# Patient Record
Sex: FEMALE | Race: WHITE | NOT HISPANIC OR LATINO | Employment: FULL TIME
[De-identification: names, ages, dates, MRNs, and addresses within clinical notes are randomized per-mention and may not be internally consistent; named-entity substitution may affect disease eponyms.]

---

## 2018-05-18 ENCOUNTER — RECORDS - HEALTHEAST (OUTPATIENT)
Dept: ADMINISTRATIVE | Facility: OTHER | Age: 33
End: 2018-05-18

## 2018-05-22 ENCOUNTER — RECORDS - HEALTHEAST (OUTPATIENT)
Dept: ADMINISTRATIVE | Facility: OTHER | Age: 33
End: 2018-05-22

## 2018-06-13 ENCOUNTER — HOSPITAL ENCOUNTER (OUTPATIENT)
Dept: OBGYN | Facility: CLINIC | Age: 33
Discharge: HOME | End: 2018-06-13

## 2018-06-15 ENCOUNTER — HOME CARE/HOSPICE - HEALTHEAST (OUTPATIENT)
Dept: HOME HEALTH SERVICES | Facility: HOME HEALTH | Age: 33
End: 2018-06-15

## 2018-06-17 ENCOUNTER — HOME CARE/HOSPICE - HEALTHEAST (OUTPATIENT)
Dept: HOME HEALTH SERVICES | Facility: HOME HEALTH | Age: 33
End: 2018-06-17

## 2019-09-21 ENCOUNTER — AMBULATORY - HEALTHEAST (OUTPATIENT)
Dept: NURSING | Facility: CLINIC | Age: 34
End: 2019-09-21

## 2021-06-26 ENCOUNTER — HEALTH MAINTENANCE LETTER (OUTPATIENT)
Age: 36
End: 2021-06-26

## 2021-10-16 ENCOUNTER — HEALTH MAINTENANCE LETTER (OUTPATIENT)
Age: 36
End: 2021-10-16

## 2021-11-01 ENCOUNTER — TRANSFERRED RECORDS (OUTPATIENT)
Dept: HEALTH INFORMATION MANAGEMENT | Facility: CLINIC | Age: 36
End: 2021-11-01
Payer: COMMERCIAL

## 2021-11-01 LAB
CHOLESTEROL (EXTERNAL): 188 MG/DL
HBA1C MFR BLD: 5 %
HDLC SERPL-MCNC: 63 MG/DL
LDL CHOLESTEROL (EXTERNAL): 104 MG/DL
NON HDL CHOLESTEROL (EXTERNAL): 125 MG/DL
TRIGLYCERIDES (EXTERNAL): 115 MG/DL
TSH SERPL-ACNC: 0.17 MIU/L (ref 0.4–4.5)

## 2021-11-05 ENCOUNTER — MEDICAL CORRESPONDENCE (OUTPATIENT)
Dept: HEALTH INFORMATION MANAGEMENT | Facility: CLINIC | Age: 36
End: 2021-11-05
Payer: COMMERCIAL

## 2021-11-09 ENCOUNTER — TELEPHONE (OUTPATIENT)
Dept: ENDOCRINOLOGY | Facility: CLINIC | Age: 36
End: 2021-11-09
Payer: COMMERCIAL

## 2021-11-09 ENCOUNTER — TRANSCRIBE ORDERS (OUTPATIENT)
Dept: OTHER | Age: 36
End: 2021-11-09
Payer: COMMERCIAL

## 2021-11-09 DIAGNOSIS — R79.89 LOW TSH LEVEL: Primary | ICD-10-CM

## 2021-11-09 NOTE — TELEPHONE ENCOUNTER
[facility] Call Center    Phone Message    May a detailed message be left on voicemail: yes     Reason for Call: Other: New pt unable to schedule within first two weeks, per protocol sending message to see if pt would be able to be scheduled earlier. pt requesting call back at #[phone]     Action Taken: Other: Endo    Travel Screening: Not Applicable

## 2021-11-11 NOTE — TELEPHONE ENCOUNTER
Ok for first available. Pt can call if she is having symptoms of hyperthyroidism so we can try to get her a sooner appt.

## 2021-11-11 NOTE — TELEPHONE ENCOUNTER
Date: 1/28/2022 Status: Scheduled   Time: 1:00 PM Length: 60   Visit Type: NEW ENDOCRINE [[id number]] Copay: $0.00   Provider: Kd Richard MD

## 2021-11-22 ENCOUNTER — TELEPHONE (OUTPATIENT)
Dept: ENDOCRINOLOGY | Facility: CLINIC | Age: 36
End: 2021-11-22
Payer: COMMERCIAL

## 2021-11-22 NOTE — TELEPHONE ENCOUNTER
[facility] Call [facility]    Phone Message    May a detailed message be left on voicemail: yes     Reason for Call: Patient was exposed to COVID-19 over the weekend , Patient is fully vaccinated, wants to know if in person appointment scheduled 11/24/2021 would need to be rescheduled? Please call patient back to advise.    Action Taken: Other: Endo    Travel Screening: Not Applicable

## 2021-11-24 ENCOUNTER — TELEPHONE (OUTPATIENT)
Dept: ENDOCRINOLOGY | Facility: CLINIC | Age: 36
End: 2021-11-24

## 2021-11-24 ENCOUNTER — VIRTUAL VISIT (OUTPATIENT)
Dept: ENDOCRINOLOGY | Facility: CLINIC | Age: 36
End: 2021-11-24
Attending: OBSTETRICS & GYNECOLOGY
Payer: COMMERCIAL

## 2021-11-24 DIAGNOSIS — R94.6 ABNORMAL FINDING ON THYROID FUNCTION TEST: Primary | ICD-10-CM

## 2021-11-24 PROBLEM — G43.909 MIGRAINE: Status: ACTIVE | Noted: 2021-11-24

## 2021-11-24 PROCEDURE — 99204 OFFICE O/P NEW MOD 45 MIN: CPT | Mod: 95 | Performed by: INTERNAL MEDICINE

## 2021-11-24 RX ORDER — NORETHINDRONE ACETATE AND ETHINYL ESTRADIOL 1MG-20(21)
1 KIT ORAL DAILY
COMMUNITY
End: 2022-04-29 | Stop reason: ALTCHOICE

## 2021-11-24 RX ORDER — PEDIATRIC MULTIVIT 61/D3/VIT K 1500-800
1 CAPSULE ORAL DAILY
COMMUNITY
End: 2023-11-03

## 2021-11-24 NOTE — PROGRESS NOTES
With that, ENDOCRINOLOGY NEW PATIENT VISIT        HISTORY OF PRESENT ILLNESS    Marylu Ibanez is seen in consultation at the request of Dr. Barry for abnormal thyroid function test.    The patient had annual gynecology visit on 2021: Labs drawn at that visit revealed low TSH, prompting referral to endocrinology.    The patient has been feeling generally well and has not noticed unusual symptoms: She has had some fatigue, but is also working and parenting young children and had attributed it to her life commitments.  No weight loss (perhaps 5-7 lb weight gain during pandemic)  No heat intolerance (chronic cold intolerance).  No tremor.  No palpitations.  No change in bowel movements.  No changes in skin/hair texture.     No goiter.  No dysphagia.  No hoarseness.  No stridor.    No recent sore throat or other URI symptoms.  No fevers.    No eye pain (dry eyes with contact lens use).  No conjunctival injection.  No visual changes.    No biotin supplement use. No recent steroid therapy.    No family history of thyroid disease.       On OCP for contraception at present, cycles are light and every few months.  No plans for conception in the future.    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 .    PAST MEDICAL HISTORY  History reviewed. No pertinent past medical history.  Patient Active Problem List   Diagnosis     Migraine       MEDICATIONS  Current Outpatient Medications   Medication Sig Dispense Refill     mvw complete formulation (CHEWABLES) tablet Take 1 tablet by mouth daily       norethindrone-ethinyl estradiol (JUNEL FE 1/20) 1-20 MG-MCG tablet Take 1 tablet by mouth daily         Allergies, family, and social history were reviewed and documented as needed in EHR.     REVIEW OF SYSTEMS  A focused ROS was performed, with pertinent positives and negatives as noted in the HPI.    PHYSICAL EXAM  There were no vitals taken for this visit.  There is no height or  weight on file to calculate BMI.  Constitutional: Patient is alert, oriented and appears in no acute distress.  Eyes: Eyes grossly normal to inspection, EOMI, no stare, lid lag, or retraction; no conjunctival injection.  ENMT: Lips are without lesions.   Neck: No visible goiter or neck mass.  Respiratory: No audible wheeze or cough. No visible cyanosis. No visible increased work of breathing.  Neurological: Alert and oriented times 3.  Cranial nerves grossly intact.      DATA REVIEW  Each of the following laboratory and/or imaging studies were reviewed.    Labs from [facility] reviewed.  Pertinent findings are below:  11/1/2021: TSH 0.17.  Cholesterol 188, HDL 63, triglycerides 115, .  Hemoglobin A1c 5%.      ASSESSMENT  1.  Abnormal thyroid function test.  Low TSH.  Does not have overt thyrotoxic symptoms, not on medications that could potentially cause thyroid function test abnormalities.  Would evaluate TSH along with T4 and T3 and serology for Graves' disease.  Based on results, we can determine next best steps. If labs are consistent with Graves' disease, may follow carefully if she has subclinical hyperthyroidism or intervene sooner to discuss treatment options if she has overt hyperthyroidism.  Conversely, if serology is negative, and abnormal thyroid function tests persist, would consider radioiodine uptake and scan to better differentiate cause.  We discussed normal physiology of hypothalamic-pituitary-thyroidal axis, pathophysiology of hyperthyroidism and potential causes of hyperthyroidism.  We also briefly reviewed treatment options, although we will review these in more detail if treatment is indicated.    PLAN  -Labs when safely able to go to lab for blood draw  -Based on results, we will decide on next best steps for testing and/or treatment  -Return for a follow-up visit in 2 months (we will coordinate closer follow-up if we need to discuss results in more detail  sooner)  -We will communicate results via [employer], or if needed by phone    Orders Placed This Encounter   Procedures     HCG Quantitative Pregnancy, Blood ([id number])     TSH     T4 free     T3 total     Thyroid stimulating immunoglobulin     Thyrotropin Receptor Antibody         I spent a total of 45 minutes on the date of encounter reviewing medical records, evaluating the patient, coordinating care and documenting in the EHR, as detailed above.      Video-Visit Details    Type of service:  Video Visit    Video Start Time: 12:52 PM  Video End Time: 1:18 PM    Platform used for Video Visit: Carla Richard MD   Division of Diabetes, Endocrinology and Metabolism  Department of Medicine    cc: Prema Barry DO; Hilary Coyne MD         Marylu is a 36 year old who is being evaluated via a billable video visit.      How would you like to obtain your AVS? [facility]  If the video visit is dropped, the invitation should be resent by: Text to cell phone: [phone]  Will anyone else be joining your video visit? no

## 2021-11-24 NOTE — PATIENT INSTRUCTIONS
-Labs when safely able to go to lab for blood draw  -Based on results, we will decide on next best steps for testing and/or treatment  -Return for a follow-up visit in 2 months (we will coordinate closer follow-up if we need to discuss results in more detail sooner)  -We will communicate results via [employer], or if needed by phone

## 2021-11-24 NOTE — LETTER
2021         RE: Marylu Ibanez  [address] [facility] [address]        Dear Colleague,    Thank you for referring your patient, Marylu Ibanez, to the [facility]. Please see a copy of my visit note below.    With that, ENDOCRINOLOGY NEW PATIENT VISIT        HISTORY OF PRESENT ILLNESS    Marylu Ibanez is seen in consultation at the request of Dr. Barry for abnormal thyroid function test.    The patient had annual gynecology visit on 2021: Labs drawn at that visit revealed low TSH, prompting referral to endocrinology.    The patient has been feeling generally well and has not noticed unusual symptoms: She has had some fatigue, but is also working and parenting young children and had attributed it to her life commitments.  No weight loss (perhaps 5-7 lb weight gain during pandemic)  No heat intolerance (chronic cold intolerance).  No tremor.  No palpitations.  No change in bowel movements.  No changes in skin/hair texture.     No goiter.  No dysphagia.  No hoarseness.  No stridor.    No recent sore throat or other URI symptoms.  No fevers.    No eye pain (dry eyes with contact lens use).  No conjunctival injection.  No visual changes.    No biotin supplement use. No recent steroid therapy.    No family history of thyroid disease.       On OCP for contraception at present, cycles are light and every few months.  No plans for conception in the future.    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 .    PAST MEDICAL HISTORY  History reviewed. No pertinent past medical history.  Patient Active Problem List   Diagnosis     Migraine       MEDICATIONS  Current Outpatient Medications   Medication Sig Dispense Refill     mvw complete formulation (CHEWABLES) tablet Take 1 tablet by mouth daily       norethindrone-ethinyl estradiol (JUNEL FE 1/20) 1-20 MG-MCG tablet Take 1 tablet by mouth daily         Allergies, family, and social  history were reviewed and documented as needed in EHR.     REVIEW OF SYSTEMS  A focused ROS was performed, with pertinent positives and negatives as noted in the HPI.    PHYSICAL EXAM  There were no vitals taken for this visit.  There is no height or weight on file to calculate BMI.  Constitutional: Patient is alert, oriented and appears in no acute distress.  Eyes: Eyes grossly normal to inspection, EOMI, no stare, lid lag, or retraction; no conjunctival injection.  ENMT: Lips are without lesions.   Neck: No visible goiter or neck mass.  Respiratory: No audible wheeze or cough. No visible cyanosis. No visible increased work of breathing.  Neurological: Alert and oriented times 3.  Cranial nerves grossly intact.      DATA REVIEW  Each of the following laboratory and/or imaging studies were reviewed.    Labs from [facility] reviewed.  Pertinent findings are below:  11/1/2021: TSH 0.17.  Cholesterol 188, HDL 63, triglycerides 115, .  Hemoglobin A1c 5%.      ASSESSMENT  1.  Abnormal thyroid function test.  Low TSH.  Does not have overt thyrotoxic symptoms, not on medications that could potentially cause thyroid function test abnormalities.  Would evaluate TSH along with T4 and T3 and serology for Graves' disease.  Based on results, we can determine next best steps. If labs are consistent with Graves' disease, may follow carefully if she has subclinical hyperthyroidism or intervene sooner to discuss treatment options if she has overt hyperthyroidism.  Conversely, if serology is negative, and abnormal thyroid function tests persist, would consider radioiodine uptake and scan to better differentiate cause.  We discussed normal physiology of hypothalamic-pituitary-thyroidal axis, pathophysiology of hyperthyroidism and potential causes of hyperthyroidism.  We also briefly reviewed treatment options, although we will review these in more detail if treatment is indicated.    PLAN  -Labs when safely  able to go to lab for blood draw  -Based on results, we will decide on next best steps for testing and/or treatment  -Return for a follow-up visit in 2 months (we will coordinate closer follow-up if we need to discuss results in more detail sooner)  -We will communicate results via [employer], or if needed by phone    Orders Placed This Encounter   Procedures     HCG Quantitative Pregnancy, Blood ([id number])     TSH     T4 free     T3 total     Thyroid stimulating immunoglobulin     Thyrotropin Receptor Antibody         I spent a total of 45 minutes on the date of encounter reviewing medical records, evaluating the patient, coordinating care and documenting in the EHR, as detailed above.      Video-Visit Details    Type of service:  Video Visit    Video Start Time: 12:52 PM  Video End Time: 1:18 PM    Platform used for Video Visit: Carla Richard MD   Division of Diabetes, Endocrinology and Metabolism  Department of Medicine    cc: Prema Barry DO; Hilary Coyne MD         Marylu is a 36 year old who is being evaluated via a billable video visit.      How would you like to obtain your AVS? [employer]  If the video visit is dropped, the invitation should be resent by: Text to cell phone: [phone]  Will anyone else be joining your video visit? no              Again, thank you for allowing me to participate in the care of your patient.        Sincerely,        IRVING Richard MD

## 2021-11-24 NOTE — TELEPHONE ENCOUNTER
[facility] Call Center    Phone Message    May a detailed message be left on voicemail: yes     Reason for Call: Other: Pt called at 12:45pm because the provider had still not joined the video appt. Writer informed pt unsure if provider is running behind. Pt appears to be checked in for appt in her chart. Please call pt if there are to be any further delays with provider joining the appt today.     Action Taken: Message routed to:  Other: endocrine    Travel Screening: Not Applicable

## 2021-12-14 ENCOUNTER — LAB (OUTPATIENT)
Dept: LAB | Facility: CLINIC | Age: 36
End: 2021-12-14
Payer: COMMERCIAL

## 2021-12-14 DIAGNOSIS — R94.6 ABNORMAL FINDING ON THYROID FUNCTION TEST: ICD-10-CM

## 2021-12-14 LAB
HCG SERPL-ACNC: <2 MLU/ML (ref 0–4)
T3 SERPL-MCNC: 122 NG/DL (ref 60–181)
T4 FREE SERPL-MCNC: 0.98 NG/DL (ref 0.7–1.8)
TSH SERPL DL<=0.005 MIU/L-ACNC: 0.28 UIU/ML (ref 0.3–5)

## 2021-12-14 PROCEDURE — 84445 ASSAY OF TSI GLOBULIN: CPT | Mod: 90

## 2021-12-14 PROCEDURE — 84443 ASSAY THYROID STIM HORMONE: CPT

## 2021-12-14 PROCEDURE — 84439 ASSAY OF FREE THYROXINE: CPT

## 2021-12-14 PROCEDURE — 36415 COLL VENOUS BLD VENIPUNCTURE: CPT

## 2021-12-14 PROCEDURE — 84702 CHORIONIC GONADOTROPIN TEST: CPT

## 2021-12-14 PROCEDURE — 84480 ASSAY TRIIODOTHYRONINE (T3): CPT

## 2021-12-14 PROCEDURE — 83520 IMMUNOASSAY QUANT NOS NONAB: CPT | Mod: 90

## 2021-12-14 PROCEDURE — 99000 SPECIMEN HANDLING OFFICE-LAB: CPT

## 2021-12-16 LAB — TSH RECEP AB SER-ACNC: <1.1 IU/L (ref 0–1.75)

## 2021-12-21 LAB — TSI SER-ACNC: <1 TSI INDEX

## 2022-01-25 ENCOUNTER — LAB (OUTPATIENT)
Dept: LAB | Facility: CLINIC | Age: 37
End: 2022-01-25
Payer: COMMERCIAL

## 2022-01-25 DIAGNOSIS — R94.6 ABNORMAL FINDING ON THYROID FUNCTION TEST: ICD-10-CM

## 2022-01-25 LAB
T3 SERPL-MCNC: 131 NG/DL (ref 60–181)
T4 FREE SERPL-MCNC: 1.07 NG/DL (ref 0.7–1.8)
TSH SERPL DL<=0.005 MIU/L-ACNC: 0.26 UIU/ML (ref 0.3–5)

## 2022-01-25 PROCEDURE — 84439 ASSAY OF FREE THYROXINE: CPT

## 2022-01-25 PROCEDURE — 84480 ASSAY TRIIODOTHYRONINE (T3): CPT

## 2022-01-25 PROCEDURE — 36415 COLL VENOUS BLD VENIPUNCTURE: CPT

## 2022-01-25 PROCEDURE — 84443 ASSAY THYROID STIM HORMONE: CPT

## 2022-01-28 ENCOUNTER — OFFICE VISIT (OUTPATIENT)
Dept: ENDOCRINOLOGY | Facility: CLINIC | Age: 37
End: 2022-01-28
Payer: COMMERCIAL

## 2022-01-28 VITALS
BODY MASS INDEX: 22.45 KG/M2 | DIASTOLIC BLOOD PRESSURE: 72 MMHG | SYSTOLIC BLOOD PRESSURE: 120 MMHG | WEIGHT: 134.9 LBS | HEART RATE: 80 BPM

## 2022-01-28 DIAGNOSIS — R94.6 ABNORMAL FINDING ON THYROID FUNCTION TEST: Primary | ICD-10-CM

## 2022-01-28 PROCEDURE — 99214 OFFICE O/P EST MOD 30 MIN: CPT | Performed by: INTERNAL MEDICINE

## 2022-01-28 NOTE — PROGRESS NOTES
ENDOCRINOLOGY FOLLOW-UP         HISTORY OF PRESENT ILLNESS    Marylu Ibanez is seen in follow-up for the issues outlined below.     After initial visit on 2021, I referred patient for additional studies.  Thyroid function tests remained abnormal (although TSH was slightly improved) while TSH receptor antibody and TSI were negative.  We had contemplated radioiodine uptake and scan with the patient was diagnosed with COVID-19 infection in early 2022 and did not have a chance to give more thought to this testing.    Her COVID-19 symptoms were mild: She had headache and body aches as well as sore throat which resolved within a few days.  Outside of those symptoms, she has been feeling well: Energy is good.  No tremors, no palpitations, no change in temperature tolerance.  Menstrual cycles remain regular, albeit light, on OCP.    No eye discomfort, vision changes or conjunctival injection.    Pertinent endocrine and related history:  1.  Abnormal thyroid function tests. The patient had annual gynecology visit on 2021: Labs drawn at that visit revealed low TSH, prompting referral to endocrinology.  -At initial endocrinology evaluation, the patient had been feeling well and had not noticed overt thyrotoxic symptoms.  -. On OCP for contraception. No plans for conception in the future.    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 .    MEDICATIONS  Current Outpatient Medications   Medication Sig Dispense Refill     mvw complete formulation (CHEWABLES) tablet Take 1 tablet by mouth daily       norethindrone-ethinyl estradiol (JUNEL FE 1/20) 1-20 MG-MCG tablet Take 1 tablet by mouth daily         Allergies, family, and social history were reviewed and documented as needed in EHR.     REVIEW OF SYSTEMS  A focused ROS was performed, with pertinent positives and negatives as noted in the HPI.    PHYSICAL EXAM  /72 (BP Location: Right arm, Patient Position:  Sitting, Cuff Size: Adult Regular)   Pulse 80   Wt 61.2 kg (134 lb 14.4 oz)   BMI 22.45 kg/m    Body mass index is 22.45 kg/m .  Constitutional: Vital signs reviewed, as recorded above. Patient is alert, oriented and appears in no acute distress.  Eyes: PER, EOMI, no stare, lid lag, or retraction; no conjunctival injection.  Neck: Neck supple, no palpable thyromegaly.  Cardiovascular: RRR, normal S1/S2, no audible murmurs, rubs or gallops.  Respiratory: CTAB, without wheezes, crackles or rhonchi; normal chest wall motion and respiratory effort.  MSK: No clubbing or cyanosis; normal muscle bulk and tone.  Skin: Normal skin color, temperature, turgor and texture.  Neurological: Alert and oriented times 3.  Very fine tremor of outstretched hands.    DATA REVIEW  Each of the following laboratory and/or imaging studies were reviewed.       12/14/2021 13:25 1/25/2022 13:55   hCG Quantitative <2    T4 Free 0.98 1.07   Thyroid Stim Immunog <1.0    Triiodothyronine (T3) 122 131   TSH 0.28 (L) 0.26 (L)   Thyrotropin Receptor Antibody <1.10      ASSESSMENT  1.  Abnormal thyroid function test.  Low TSH, which persists on follow-up testing.  However, T4 and T3 levels are normal and serology for Graves' disease is negative.  Seronegative Graves' remains on the differential, as does hyperfunctioning nodule(s).  Therefore, although she does not have overt thyrotoxic symptoms, would recommend proceeding with radioiodine uptake and scan to better differentiate cause of subclinical hyperthyroidism and make plans for long-term management.  We discussed differential diagnosis, proposed testing as well as options for management based on the cause of hyperthyroidism.  We will add on pregnancy test in anticipation of radioiodine uptake and scan.  If she is not able to schedule study within the next few weeks, she will update me so that we can recheck thyroid function test and pregnancy test closer to the time of imaging study.  We will  plan on clinic follow-up in 3 months, sooner if needed.    PLAN  -For now, careful follow-up  -Schedule radioiodine uptake and scan (if scheduling beyond 3 weeks out, let me know and I can order labs to be drawn closer to the time of imaging study)  -Return for a follow-up visit in 3 months  -We will communicate results via [email or web address], or if needed by phone    Orders Placed This Encounter   Procedures     NM Thyroid Uptake and Scan     HCG Quantitative Pregnancy, Blood ([id number])     I spent a total of 34 minutes on the date of encounter reviewing medical records, evaluating the patient, coordinating care and documenting in the EHR, as detailed above.    Kd Richard MD   Division of Diabetes, Endocrinology and Metabolism  Department of Medicine

## 2022-01-28 NOTE — PATIENT INSTRUCTIONS
-For now,careful follow-up  -Schedule radioiodine uptake and scan (if scheduling beyond 3 weeks out, let me know and I can order thyroid function tests to be drawn closer to the time of imaging study)  -Return for a follow-up visit in 3 months  -We will communicate results via [employer], or if needed by phone

## 2022-01-28 NOTE — LETTER
2022         RE: Marylu Ibanez  [address] [facility] [address]        Dear Colleague,    Thank you for referring your patient, Marylu Ibanez, to the [facility]. Please see a copy of my visit note below.      ENDOCRINOLOGY FOLLOW-UP         HISTORY OF PRESENT ILLNESS    Marylu Ibanez is seen in follow-up for the issues outlined below.     After initial visit on 2021, I referred patient for additional studies.  Thyroid function tests remained abnormal (although TSH was slightly improved) while TSH receptor antibody and TSI were negative.  We had contemplated radioiodine uptake and scan with the patient was diagnosed with COVID-19 infection in early 2022 and did not have a chance to give more thought to this testing.    Her COVID-19 symptoms were mild: She had headache and body aches as well as sore throat which resolved within a few days.  Outside of those symptoms, she has been feeling well: Energy is good.  No tremors, no palpitations, no change in temperature tolerance.  Menstrual cycles remain regular, albeit light, on OCP.    No eye discomfort, vision changes or conjunctival injection.    Pertinent endocrine and related history:  1.  Abnormal thyroid function tests. The patient had annual gynecology visit on 2021: Labs drawn at that visit revealed low TSH, prompting referral to endocrinology.  -At initial endocrinology evaluation, the patient had been feeling well and had not noticed overt thyrotoxic symptoms.  -. On OCP for contraception. No plans for conception in the future.    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 .    MEDICATIONS  Current Outpatient Medications   Medication Sig Dispense Refill     mvw complete formulation (CHEWABLES) tablet Take 1 tablet by mouth daily       norethindrone-ethinyl estradiol (JUNEL FE 1/20) 1-20 MG-MCG tablet Take 1 tablet by mouth daily         Allergies, family,  and social history were reviewed and documented as needed in EHR.     REVIEW OF SYSTEMS  A focused ROS was performed, with pertinent positives and negatives as noted in the HPI.    PHYSICAL EXAM  /72 (BP Location: Right arm, Patient Position: Sitting, Cuff Size: Adult Regular)   Pulse 80   Wt 61.2 kg (134 lb 14.4 oz)   BMI 22.45 kg/m    Body mass index is 22.45 kg/m .  Constitutional: Vital signs reviewed, as recorded above. Patient is alert, oriented and appears in no acute distress.  Eyes: PER, EOMI, no stare, lid lag, or retraction; no conjunctival injection.  Neck: Neck supple, no palpable thyromegaly.  Cardiovascular: RRR, normal S1/S2, no audible murmurs, rubs or gallops.  Respiratory: CTAB, without wheezes, crackles or rhonchi; normal chest wall motion and respiratory effort.  MSK: No clubbing or cyanosis; normal muscle bulk and tone.  Skin: Normal skin color, temperature, turgor and texture.  Neurological: Alert and oriented times 3.  Very fine tremor of outstretched hands.    DATA REVIEW  Each of the following laboratory and/or imaging studies were reviewed.       12/14/2021 13:25 1/25/2022 13:55   hCG Quantitative <2    T4 Free 0.98 1.07   Thyroid Stim Immunog <1.0    Triiodothyronine (T3) 122 131   TSH 0.28 (L) 0.26 (L)   Thyrotropin Receptor Antibody <1.10      ASSESSMENT  1.  Abnormal thyroid function test.  Low TSH, which persists on follow-up testing.  However, T4 and T3 levels are normal and serology for Graves' disease is negative.  Seronegative Graves' remains on the differential, as does hyperfunctioning nodule(s).  Therefore, although she does not have overt thyrotoxic symptoms, would recommend proceeding with radioiodine uptake and scan to better differentiate cause of subclinical hyperthyroidism and make plans for long-term management.  We discussed differential diagnosis, proposed testing as well as options for management based on the cause of hyperthyroidism.  We will add on  pregnancy test in anticipation of radioiodine uptake and scan.  If she is not able to schedule study within the next few weeks, she will update me so that we can recheck thyroid function test and pregnancy test closer to the time of imaging study.  We will plan on clinic follow-up in 3 months, sooner if needed.    PLAN  -For now, careful follow-up  -Schedule radioiodine uptake and scan (if scheduling beyond 3 weeks out, let me know and I can order labs to be drawn closer to the time of imaging study)  -Return for a follow-up visit in 3 months  -We will communicate results via [employer], or if needed by phone    Orders Placed This Encounter   Procedures     NM Thyroid Uptake and Scan     HCG Quantitative Pregnancy, Blood ([id number])     I spent a total of 34 minutes on the date of encounter reviewing medical records, evaluating the patient, coordinating care and documenting in the EHR, as detailed above.    Irving Richard MD   Division of Diabetes, Endocrinology and Metabolism  Department of Medicine              Again, thank you for allowing me to participate in the care of your patient.        Sincerely,        IRVING Richard MD

## 2022-02-07 ENCOUNTER — HOSPITAL ENCOUNTER (OUTPATIENT)
Dept: NUCLEAR MEDICINE | Facility: HOSPITAL | Age: 37
Discharge: HOME | End: 2022-02-07
Attending: INTERNAL MEDICINE | Admitting: INTERNAL MEDICINE
Payer: COMMERCIAL

## 2022-02-07 DIAGNOSIS — R94.6 ABNORMAL FINDING ON THYROID FUNCTION TEST: ICD-10-CM

## 2022-02-07 PROCEDURE — 343N000001 HC RX 343: Performed by: INTERNAL MEDICINE

## 2022-02-07 PROCEDURE — A9516 IODINE I-123 SOD IODIDE MIC: HCPCS | Performed by: INTERNAL MEDICINE

## 2022-02-07 PROCEDURE — 78014 THYROID IMAGING W/BLOOD FLOW: CPT

## 2022-02-07 RX ADMIN — Medication 255 UCI.: at 07:33

## 2022-02-08 ENCOUNTER — HOSPITAL ENCOUNTER (OUTPATIENT)
Dept: NUCLEAR MEDICINE | Facility: HOSPITAL | Age: 37
End: 2022-02-08
Attending: INTERNAL MEDICINE
Payer: COMMERCIAL

## 2022-04-26 ENCOUNTER — LAB (OUTPATIENT)
Dept: LAB | Facility: CLINIC | Age: 37
End: 2022-04-26
Payer: COMMERCIAL

## 2022-04-26 DIAGNOSIS — R94.6 ABNORMAL FINDING ON THYROID FUNCTION TEST: ICD-10-CM

## 2022-04-26 LAB
T3 SERPL-MCNC: 121 NG/DL (ref 60–181)
T4 FREE SERPL-MCNC: 0.98 NG/DL (ref 0.7–1.8)
TSH SERPL DL<=0.005 MIU/L-ACNC: 0.23 UIU/ML (ref 0.3–5)

## 2022-04-26 PROCEDURE — 36415 COLL VENOUS BLD VENIPUNCTURE: CPT

## 2022-04-26 PROCEDURE — 84480 ASSAY TRIIODOTHYRONINE (T3): CPT

## 2022-04-26 PROCEDURE — 84439 ASSAY OF FREE THYROXINE: CPT

## 2022-04-26 PROCEDURE — 84443 ASSAY THYROID STIM HORMONE: CPT

## 2022-04-29 ENCOUNTER — OFFICE VISIT (OUTPATIENT)
Dept: ENDOCRINOLOGY | Facility: CLINIC | Age: 37
End: 2022-04-29
Payer: COMMERCIAL

## 2022-04-29 VITALS
DIASTOLIC BLOOD PRESSURE: 64 MMHG | BODY MASS INDEX: 23.3 KG/M2 | WEIGHT: 140 LBS | SYSTOLIC BLOOD PRESSURE: 110 MMHG | HEART RATE: 80 BPM

## 2022-04-29 DIAGNOSIS — E05.90 SUBCLINICAL HYPERTHYROIDISM: ICD-10-CM

## 2022-04-29 DIAGNOSIS — E05.00 GRAVES DISEASE: Primary | ICD-10-CM

## 2022-04-29 PROCEDURE — 99214 OFFICE O/P EST MOD 30 MIN: CPT | Performed by: INTERNAL MEDICINE

## 2022-04-29 RX ORDER — NORETHINDRONE ACETATE AND ETHINYL ESTRADIOL 1; 20 MG/1; UG/1
1 TABLET ORAL DAILY
COMMUNITY
Start: 2022-04-23

## 2022-04-29 NOTE — LETTER
2022         RE: Marylu Ibanez  [address] [facility] [id number]        Dear Colleague,    Thank you for referring your patient, Marylu Ibanez, to the [facility]. Please see a copy of my visit note below.      ENDOCRINOLOGY FOLLOW-UP         HISTORY OF PRESENT ILLNESS    Marylu Ibanez is seen in follow-up for the issues outlined below.     After last visit in 2022, I referred patient for radioiodine uptake and scan given persistently abnormal thyroid function tests and negative Graves' serology.    She had radioiodine uptake and scan performed on 2022.  The study showed 9.7% uptake at 3 hours, 20.7% uptake at 24 hours.  Tracer uptake was diffuse throughout the gland without hot or cold nodules.    She feels well: Energy is fairly good.  No tremors or palpitations.  No change in bowel habits.  No change in temperature tolerance.  Menstrual cycles remain regular on OCP, although light.    No eye discomfort, redness, prominence or change in vision such as diplopia.    Pertinent endocrine and related history:  1.  Abnormal thyroid function tests. The patient had annual gynecology visit on 2021: Labs drawn at that visit revealed low TSH, prompting referral to endocrinology.  -At initial endocrinology evaluation, the patient had been feeling well and had not noticed overt thyrotoxic symptoms.  -TSI and TSH receptor antibody checked in 2021 were negative.  -. On OCP for contraception. No plans for conception in the future.    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 .    PAST MEDICAL HISTORY  History reviewed. No pertinent past medical history.    MEDICATIONS  Current Outpatient Medications   Medication Sig Dispense Refill     JUNEL 1/20 1-20 MG-MCG tablet 1 tablet daily       mvw complete formulation (CHEWABLES) tablet Take 1 tablet by mouth daily         Allergies, family, and social history were reviewed and documented  as needed in EHR.     REVIEW OF SYSTEMS  A focused ROS was performed, with pertinent positives and negatives as noted in the HPI.    PHYSICAL EXAM  /64 (BP Location: Right arm, Patient Position: Sitting, Cuff Size: Adult Regular)   Pulse 80   Wt 63.5 kg (140 lb)   BMI 23.30 kg/m    Body mass index is 23.3 kg/m .  Constitutional: Vital signs reviewed, as recorded above. Patient is alert, oriented and appears in no acute distress.  Eyes: PER, EOMI, no stare, lid lag, or retraction; no conjunctival injection.  Neck: Neck supple, no palpable thyromegaly.  Cardiovascular: RRR, normal S1/S2, no audible murmurs, rubs or gallops.  Respiratory: CTAB, without wheezes, crackles or rhonchi; normal chest wall motion and respiratory effort.  MSK: No clubbing or cyanosis; normal muscle bulk and tone.  Skin: Normal skin color, temperature, turgor and texture.  Neurological: Alert and oriented times 3.  No tremor, reflexes 2+ and symmetric with normal relaxation phase.    DATA REVIEW  Each of the following laboratory and/or imaging studies were reviewed.    Lab on 2022   Component Date Value Ref Range Status     T3 Total 2022 121  60 - 181 ng/dL Final     Free T4 2022 0.98  0.70 - 1.80 ng/dL Final    Performance of the Free T4 test has not been established with  specimens (<= 2 months of age).       TSH 2022 0.23 (A) 0.30 - 5.00 uIU/mL Final         ASSESSMENT  1.  Subclinical hyperthyroidism, due to Graves' disease.  Persistently low TSH, with normal T4 and T3 consistent with subclinical hyperthyroidism which persists on follow-up testing.  Although seronegative, radioiodine uptake and scan performed in 2022 (personally reviewed images with patient) shows a diffuse uptake pattern consistent with Graves' disease.  The patient remains asymptomatic.  Given her age, subtlety of subclinical hyperthyroidism and potential for Graves' to go into remission, would continue to follow carefully  without treatment.  However, if subclinical hyperthyroidism progresses, patients develop symptoms, or this degree of subclinical hyperthyroidism persists over the coming few years would consider therapy.  We discussed pathophysiology of Graves' disease, thresholds for considering treatment in subclinical hyperthyroidism, as well as treatment options (antithyroid drugs versus radioiodine therapy and surgery, their benefits and side effects).  The patient is in agreement with our plan.  We also discussed symptoms to monitor for that might suggest progressing hyperthyroidism that should prompt more immediate contact with our clinic.  If stable, anticipate follow-up in 6 months, with labs before visit.    2.  Graves' disease.  Without evidence of ophthalmopathy or dermopathy.  We discussed symptoms that might suggest ophthalmopathy that should prompt contact with me.    PLAN  -Continue careful follow-up  -Monitor symptoms and pulse; contact me if noting unusual symptoms or pulse trending higher (90's or higher after resting for 5 minutes)  -Return for a follow-up visit in 6 months, with labs before visit  -We will communicate results via [employer], or if needed by phone    Orders Placed This Encounter   Procedures     TSH     T4 free     T3 total       I spent a total of 33 minutes on the date of encounter reviewing medical records, evaluating the patient, coordinating care and documenting in the EHR, as detailed above.      Irving Richard MD   Division of Diabetes, Endocrinology and Metabolism  Department of Medicine              Again, thank you for allowing me to participate in the care of your patient.        Sincerely,        IRVING Richard MD

## 2022-04-29 NOTE — PROGRESS NOTES
ENDOCRINOLOGY FOLLOW-UP         HISTORY OF PRESENT ILLNESS    Marylu Ibanez is seen in follow-up for the issues outlined below.     After last visit in 2022, I referred patient for radioiodine uptake and scan given persistently abnormal thyroid function tests and negative Graves' serology.    She had radioiodine uptake and scan performed on 2022.  The study showed 9.7% uptake at 3 hours, 20.7% uptake at 24 hours.  Tracer uptake was diffuse throughout the gland without hot or cold nodules.    She feels well: Energy is fairly good.  No tremors or palpitations.  No change in bowel habits.  No change in temperature tolerance.  Menstrual cycles remain regular on OCP, although light.    No eye discomfort, redness, prominence or change in vision such as diplopia.    Pertinent endocrine and related history:  1.  Abnormal thyroid function tests. The patient had annual gynecology visit on 2021: Labs drawn at that visit revealed low TSH, prompting referral to endocrinology.  -At initial endocrinology evaluation, the patient had been feeling well and had not noticed overt thyrotoxic symptoms.  -TSI and TSH receptor antibody checked in 2021 were negative.  -. On OCP for contraception. No plans for conception in the future.    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 .    PAST MEDICAL HISTORY  History reviewed. No pertinent past medical history.    MEDICATIONS  Current Outpatient Medications   Medication Sig Dispense Refill     JUNEL 1/20 1-20 MG-MCG tablet 1 tablet daily       mvw complete formulation (CHEWABLES) tablet Take 1 tablet by mouth daily         Allergies, family, and social history were reviewed and documented as needed in EHR.     REVIEW OF SYSTEMS  A focused ROS was performed, with pertinent positives and negatives as noted in the HPI.    PHYSICAL EXAM  /64 (BP Location: Right arm, Patient Position: Sitting, Cuff Size: Adult Regular)   Pulse 80    Wt 63.5 kg (140 lb)   BMI 23.30 kg/m    Body mass index is 23.3 kg/m .  Constitutional: Vital signs reviewed, as recorded above. Patient is alert, oriented and appears in no acute distress.  Eyes: PER, EOMI, no stare, lid lag, or retraction; no conjunctival injection.  Neck: Neck supple, no palpable thyromegaly.  Cardiovascular: RRR, normal S1/S2, no audible murmurs, rubs or gallops.  Respiratory: CTAB, without wheezes, crackles or rhonchi; normal chest wall motion and respiratory effort.  MSK: No clubbing or cyanosis; normal muscle bulk and tone.  Skin: Normal skin color, temperature, turgor and texture.  Neurological: Alert and oriented times 3.  No tremor, reflexes 2+ and symmetric with normal relaxation phase.    DATA REVIEW  Each of the following laboratory and/or imaging studies were reviewed.    Lab on 2022   Component Date Value Ref Range Status     T3 Total 2022 121  60 - 181 ng/dL Final     Free T4 2022 0.98  0.70 - 1.80 ng/dL Final    Performance of the Free T4 test has not been established with  specimens (<= 2 months of age).       TSH 2022 0.23 (A) 0.30 - 5.00 uIU/mL Final         ASSESSMENT  1.  Subclinical hyperthyroidism, due to Graves' disease.  Persistently low TSH, with normal T4 and T3 consistent with subclinical hyperthyroidism which persists on follow-up testing.  Although seronegative, radioiodine uptake and scan performed in 2022 (personally reviewed images with patient) shows a diffuse uptake pattern consistent with Graves' disease.  The patient remains asymptomatic.  Given her age, subtlety of subclinical hyperthyroidism and potential for Graves' to go into remission, would continue to follow carefully without treatment.  However, if subclinical hyperthyroidism progresses, patients develop symptoms, or this degree of subclinical hyperthyroidism persists over the coming few years would consider therapy.  We discussed pathophysiology of Graves' disease,  thresholds for considering treatment in subclinical hyperthyroidism, as well as treatment options (antithyroid drugs versus radioiodine therapy and surgery, their benefits and side effects).  The patient is in agreement with our plan.  We also discussed symptoms to monitor for that might suggest progressing hyperthyroidism that should prompt more immediate contact with our clinic.  If stable, anticipate follow-up in 6 months, with labs before visit.    2.  Graves' disease.  Without evidence of ophthalmopathy or dermopathy.  We discussed symptoms that might suggest ophthalmopathy that should prompt contact with me.    PLAN  -Continue careful follow-up  -Monitor symptoms and pulse; contact me if noting unusual symptoms or pulse trending higher (90's or higher after resting for 5 minutes)  -Return for a follow-up visit in 6 months, with labs before visit  -We will communicate results via [employer], or if needed by phone    Orders Placed This Encounter   Procedures     TSH     T4 free     T3 total       I spent a total of 33 minutes on the date of encounter reviewing medical records, evaluating the patient, coordinating care and documenting in the EHR, as detailed above.      Kd Richard MD   Division of Diabetes, Endocrinology and Metabolism  Department of Medicine

## 2022-04-29 NOTE — PATIENT INSTRUCTIONS
-Continue careful follow-up  -Monitor symptoms and pulse; contact me if noting unusual symptoms or pulse trending higher (90's or higher after resting for 5 minutes)  -Return for a follow-up visit in 6 months, with labs before visit  -We will communicate results via [employer], or if needed by phone

## 2022-07-17 ENCOUNTER — HEALTH MAINTENANCE LETTER (OUTPATIENT)
Age: 37
End: 2022-07-17

## 2022-09-25 ENCOUNTER — HEALTH MAINTENANCE LETTER (OUTPATIENT)
Age: 37
End: 2022-09-25

## 2022-10-28 ENCOUNTER — LAB (OUTPATIENT)
Dept: LAB | Facility: CLINIC | Age: 37
End: 2022-10-28
Payer: COMMERCIAL

## 2022-10-28 DIAGNOSIS — E05.90 SUBCLINICAL HYPERTHYROIDISM: ICD-10-CM

## 2022-10-28 DIAGNOSIS — E05.00 GRAVES DISEASE: ICD-10-CM

## 2022-10-28 LAB
T3 SERPL-MCNC: 162 NG/DL (ref 85–202)
T4 FREE SERPL-MCNC: 1.16 NG/DL (ref 0.9–1.7)
TSH SERPL DL<=0.005 MIU/L-ACNC: 0.32 UIU/ML (ref 0.3–4.2)

## 2022-10-28 PROCEDURE — 84480 ASSAY TRIIODOTHYRONINE (T3): CPT

## 2022-10-28 PROCEDURE — 84443 ASSAY THYROID STIM HORMONE: CPT

## 2022-10-28 PROCEDURE — 84439 ASSAY OF FREE THYROXINE: CPT

## 2022-10-28 PROCEDURE — 36415 COLL VENOUS BLD VENIPUNCTURE: CPT

## 2022-11-04 ENCOUNTER — OFFICE VISIT (OUTPATIENT)
Dept: ENDOCRINOLOGY | Facility: CLINIC | Age: 37
End: 2022-11-04
Payer: COMMERCIAL

## 2022-11-04 VITALS
BODY MASS INDEX: 22.43 KG/M2 | HEART RATE: 76 BPM | DIASTOLIC BLOOD PRESSURE: 64 MMHG | SYSTOLIC BLOOD PRESSURE: 112 MMHG | WEIGHT: 134.8 LBS

## 2022-11-04 DIAGNOSIS — E05.00 GRAVES DISEASE: Primary | ICD-10-CM

## 2022-11-04 PROCEDURE — 99213 OFFICE O/P EST LOW 20 MIN: CPT | Performed by: INTERNAL MEDICINE

## 2022-11-04 NOTE — PROGRESS NOTES
ENDOCRINOLOGY FOLLOW-UP         HISTORY OF PRESENT ILLNESS    Marylu Ibanez is seen in follow-up for the issues outlined below.     Ms. Ibanez has been doing well since last visit.  Energy is good.  No tremors or palpitations.  No change in temperature tolerance.  Bowel movements are fairly regular.    She continues on OCP: Menstrual cycles occur generally every month, although she may not have a cycle after taking placebo week sometimes (her gynecologist told her this may be typical for the OCP that she is on).    No eye discomfort, redness or vision changes.    No change in the feel of her neck, dysphagia or hoarseness.    Pertinent endocrine and related history:  1.  Subclinical hyperthyroidism.  The patient had annual gynecology visit on 2021: Labs drawn at that visit revealed low TSH, prompting referral to endocrinology.  -At initial endocrinology evaluation, the patient had been feeling well and had not noticed overt thyrotoxic symptoms.  -TSI and TSH receptor antibody checked in 2021 were negative.  -She had radioiodine uptake and scan performed on 2022.  The study showed 9.7% uptake at 3 hours, 20.7% uptake at 24 hours.  Tracer uptake was diffuse throughout the gland without hot or cold nodules.  -. On OCP for contraception. No plans for conception in the future.    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 .    PAST MEDICAL HISTORY  No past medical history on file.    MEDICATIONS  Current Outpatient Medications   Medication Sig Dispense Refill     JUNEL 1/20 1-20 MG-MCG tablet 1 tablet daily       mvw complete formulation (CHEWABLES) tablet Take 1 tablet by mouth daily         Allergies, family, and social history were reviewed and documented as needed in EHR.     REVIEW OF SYSTEMS  A focused ROS was performed, with pertinent positives and negatives as noted in the HPI.    PHYSICAL EXAM  /64 (BP Location: Right arm, Patient Position: Sitting,  Cuff Size: Adult Regular)   Pulse 76   Wt 61.1 kg (134 lb 12.8 oz)   BMI 22.43 kg/m    Body mass index is 22.43 kg/m .  Constitutional: Vital signs reviewed, as recorded above. Patient is alert, oriented and appears in no acute distress.  Eyes: PER, EOMI, no stare, lid lag, or retraction; no conjunctival injection.  Neck: Neck supple, no palpable thyromegaly.  Cardiovascular: RRR, normal S1/S2, no audible murmurs, rubs or gallops.  Respiratory: CTAB, without wheezes, crackles or rhonchi; normal chest wall motion and respiratory effort.  MSK: No clubbing or cyanosis; normal muscle bulk and tone.  Skin: Normal skin color, temperature, turgor and texture.  Neurological: Alert and oriented times 3.  No tremor.    DATA REVIEW  Each of the following laboratory and/or imaging studies were reviewed.    Component      Latest Ref Rng & Units 10/28/2022   Triiodothyronine (T3)      85 - 202 ng/dL 162   T4 Free      0.90 - 1.70 ng/dL 1.16   TSH      0.30 - 4.20 uIU/mL 0.32       ASSESSMENT  1.  Subclinical hyperthyroidism, due to Graves' disease.  Thyroid function tests have normalized on most recent labs drawn prior to this visit.  Patient remains clinically euthyroid.  Continue careful follow-up, without any intervention at present.  We discussed pathophysiology of Graves' disease, potential for remission versus recurrence and symptoms that might suggest hyperthyroidism (for which she should contact us in the future).  We will anticipate a check of thyroid function tests in 6 months and clinic follow-up in 1 year; if thyroid function tests remain stable at follow-up, would consider transferring care back to PCP.    2.  Graves' disease.  Without evidence of ophthalmopathy or dermopathy.  At last visit in 4/2022, we discussed symptoms that might suggest ophthalmopathy that should prompt contact with me.    PLAN  -Continue careful follow-up  -Monitor symptoms and contact our clinic if new or concerning symptoms  -Labs in 6  months  -Return for a follow-up visit in 1 year, with labs before visit  -We will communicate results via MyChart, or if needed by phone    Orders Placed This Encounter   Procedures     TSH with free T4 reflex       I spent a total of 21 minutes on the date of encounter reviewing medical records, evaluating the patient, coordinating care and documenting in the EHR, as detailed above.      Kd Richard MD   Division of Diabetes, Endocrinology and Metabolism  Department of Medicine

## 2022-11-04 NOTE — LETTER
2022         RE: Marylu Ibanez  [address] [facility] [address]        Dear Colleague,    Thank you for referring your patient, Marylu Ibanez, to the [facility]. Please see a copy of my visit note below.      ENDOCRINOLOGY FOLLOW-UP         HISTORY OF PRESENT ILLNESS    Marylu Ibanez is seen in follow-up for the issues outlined below.     Ms. Ibanez has been doing well since last visit.  Energy is good.  No tremors or palpitations.  No change in temperature tolerance.  Bowel movements are fairly regular.    She continues on OCP: Menstrual cycles occur generally every month, although she may not have a cycle after taking placebo week sometimes (her gynecologist told her this may be typical for the OCP that she is on).    No eye discomfort, redness or vision changes.    No change in the feel of her neck, dysphagia or hoarseness.    Pertinent endocrine and related history:  1.  Subclinical hyperthyroidism.  The patient had annual gynecology visit on 2021: Labs drawn at that visit revealed low TSH, prompting referral to endocrinology.  -At initial endocrinology evaluation, the patient had been feeling well and had not noticed overt thyrotoxic symptoms.  -TSI and TSH receptor antibody checked in 2021 were negative.  -She had radioiodine uptake and scan performed on 2022.  The study showed 9.7% uptake at 3 hours, 20.7% uptake at 24 hours.  Tracer uptake was diffuse throughout the gland without hot or cold nodules.  -. On OCP for contraception. No plans for conception in the future.    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 .    PAST MEDICAL HISTORY  No past medical history on file.    MEDICATIONS  Current Outpatient Medications   Medication Sig Dispense Refill     JUNEL 1/20 1-20 MG-MCG tablet 1 tablet daily       mvw complete formulation (CHEWABLES) tablet Take 1 tablet by mouth daily         Allergies, family, and  social history were reviewed and documented as needed in EHR.     REVIEW OF SYSTEMS  A focused ROS was performed, with pertinent positives and negatives as noted in the HPI.    PHYSICAL EXAM  /64 (BP Location: Right arm, Patient Position: Sitting, Cuff Size: Adult Regular)   Pulse 76   Wt 61.1 kg (134 lb 12.8 oz)   BMI 22.43 kg/m    Body mass index is 22.43 kg/m .  Constitutional: Vital signs reviewed, as recorded above. Patient is alert, oriented and appears in no acute distress.  Eyes: PER, EOMI, no stare, lid lag, or retraction; no conjunctival injection.  Neck: Neck supple, no palpable thyromegaly.  Cardiovascular: RRR, normal S1/S2, no audible murmurs, rubs or gallops.  Respiratory: CTAB, without wheezes, crackles or rhonchi; normal chest wall motion and respiratory effort.  MSK: No clubbing or cyanosis; normal muscle bulk and tone.  Skin: Normal skin color, temperature, turgor and texture.  Neurological: Alert and oriented times 3.  No tremor.    DATA REVIEW  Each of the following laboratory and/or imaging studies were reviewed.    Component      Latest Ref Rng & Units 10/28/2022   Triiodothyronine (T3)      85 - 202 ng/dL 162   T4 Free      0.90 - 1.70 ng/dL 1.16   TSH      0.30 - 4.20 uIU/mL 0.32       ASSESSMENT  1.  Subclinical hyperthyroidism, due to Graves' disease.  Thyroid function tests have normalized on most recent labs drawn prior to this visit.  Patient remains clinically euthyroid.  Continue careful follow-up, without any intervention at present.  We discussed pathophysiology of Graves' disease, potential for remission versus recurrence and symptoms that might suggest hyperthyroidism (for which she should contact us in the future).  We will anticipate a check of thyroid function tests in 6 months and clinic follow-up in 1 year; if thyroid function tests remain stable at follow-up, would consider transferring care back to PCP.    2.  Graves' disease.  Without evidence of ophthalmopathy or  dermopathy.  At last visit in 4/2022, we discussed symptoms that might suggest ophthalmopathy that should prompt contact with me.    PLAN  -Continue careful follow-up  -Monitor symptoms and contact our clinic if new or concerning symptoms  -Labs in 6 months  -Return for a follow-up visit in 1 year, with labs before visit  -We will communicate results via [employer], or if needed by phone    Orders Placed This Encounter   Procedures     TSH with free T4 reflex       I spent a total of 21 minutes on the date of encounter reviewing medical records, evaluating the patient, coordinating care and documenting in the EHR, as detailed above.      Irving Richard MD   Division of Diabetes, Endocrinology and Metabolism  Department of Medicine              Again, thank you for allowing me to participate in the care of your patient.        Sincerely,        IRVING Richard MD

## 2022-11-04 NOTE — PATIENT INSTRUCTIONS
-Continue careful follow-up  -Monitor symptoms and contact our clinic if new or concerning symptoms  -Labs in 6 months  -Return for a follow-up visit in 1 year, with labs before visit  -We will communicate results via [employer], or if needed by phone

## 2023-05-12 ENCOUNTER — LAB (OUTPATIENT)
Dept: LAB | Facility: CLINIC | Age: 38
End: 2023-05-12
Payer: COMMERCIAL

## 2023-05-12 DIAGNOSIS — E05.00 GRAVES DISEASE: ICD-10-CM

## 2023-05-12 LAB — TSH SERPL DL<=0.005 MIU/L-ACNC: 0.36 UIU/ML (ref 0.3–4.2)

## 2023-05-12 PROCEDURE — 84443 ASSAY THYROID STIM HORMONE: CPT

## 2023-05-12 PROCEDURE — 36415 COLL VENOUS BLD VENIPUNCTURE: CPT

## 2023-05-16 DIAGNOSIS — E05.90 SUBCLINICAL HYPERTHYROIDISM: Primary | ICD-10-CM

## 2023-09-25 ENCOUNTER — IMMUNIZATION (OUTPATIENT)
Dept: NURSING | Facility: CLINIC | Age: 38
End: 2023-09-25
Payer: COMMERCIAL

## 2023-09-25 PROCEDURE — 90471 IMMUNIZATION ADMIN: CPT

## 2023-09-25 PROCEDURE — 90686 IIV4 VACC NO PRSV 0.5 ML IM: CPT

## 2023-10-27 ENCOUNTER — LAB (OUTPATIENT)
Dept: LAB | Facility: CLINIC | Age: 38
End: 2023-10-27
Payer: COMMERCIAL

## 2023-10-27 DIAGNOSIS — E05.90 SUBCLINICAL HYPERTHYROIDISM: ICD-10-CM

## 2023-10-27 LAB — TSH SERPL DL<=0.005 MIU/L-ACNC: 0.38 UIU/ML (ref 0.3–4.2)

## 2023-10-27 PROCEDURE — 36415 COLL VENOUS BLD VENIPUNCTURE: CPT

## 2023-10-27 PROCEDURE — 84443 ASSAY THYROID STIM HORMONE: CPT

## 2023-11-03 ENCOUNTER — OFFICE VISIT (OUTPATIENT)
Dept: ENDOCRINOLOGY | Facility: CLINIC | Age: 38
End: 2023-11-03
Payer: COMMERCIAL

## 2023-11-03 VITALS
HEART RATE: 80 BPM | WEIGHT: 139.3 LBS | SYSTOLIC BLOOD PRESSURE: 110 MMHG | BODY MASS INDEX: 23.18 KG/M2 | DIASTOLIC BLOOD PRESSURE: 62 MMHG

## 2023-11-03 DIAGNOSIS — E05.00 GRAVES DISEASE: Primary | ICD-10-CM

## 2023-11-03 PROCEDURE — 99213 OFFICE O/P EST LOW 20 MIN: CPT | Performed by: INTERNAL MEDICINE

## 2023-11-03 RX ORDER — ELECTROLYTES/DEXTROSE
1 SOLUTION, ORAL ORAL DAILY
COMMUNITY

## 2023-11-03 NOTE — LETTER
11/3/2023         RE: Marylu Ibanez  [address] [facility] [address]        Dear Colleague,    Thank you for referring your patient, Marylu Ibanez, to the [facility]. Please see a copy of my visit note below.      ENDOCRINOLOGY FOLLOW-UP         HISTORY OF PRESENT ILLNESS    Marylu Ibanez is seen in follow-up.    Has been doing well since last visit in 2022.  Energy is good.  She has no tremors or palpitations.    No change in the feel of her neck, dysphagia or voice changes.    Remains on OCP: Cycles are occurring generally every month, although she may not have a cycle after taking placebo week at times.     No eye discomfort, redness or vision changes.    Pertinent endocrine and related history:  1.  Subclinical hyperthyroidism.  The patient had annual gynecology visit on 2021: Labs drawn at that visit revealed low TSH, prompting referral to endocrinology.  -At initial endocrinology evaluation, the patient had been feeling well and had not noticed overt thyrotoxic symptoms.  -TSI and TSH receptor antibody checked in 2021 were negative.  -She had radioiodine uptake and scan performed on 2022.  The study showed 9.7% uptake at 3 hours, 20.7% uptake at 24 hours.  Tracer uptake was diffuse throughout the gland without hot or cold nodules.  -. On OCP for contraception. No plans for conception in the future.    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 .    PAST MEDICAL HISTORY  No past medical history on file.    MEDICATIONS  Current Outpatient Medications   Medication Sig Dispense Refill     JUNEL 1/20 1-20 MG-MCG tablet 1 tablet daily       Multiple Vitamin (MULTIVITAMIN ADULT) TABS 1 tablet daily         Allergies, family, and social history were reviewed and documented as needed in EHR.     REVIEW OF SYSTEMS  A focused ROS was performed, with pertinent positives and negatives as noted in the HPI.    PHYSICAL EXAM  BP  110/62 (BP Location: Right arm, Patient Position: Sitting, Cuff Size: Adult Regular)   Pulse 80   Wt 63.2 kg (139 lb 4.8 oz)   BMI 23.18 kg/m    Body mass index is 23.18 kg/m .  Constitutional: Vital signs reviewed, as recorded above. Patient is alert, oriented and appears in no acute distress.  Eyes: PER, EOMI, no stare, lid lag, or retraction; no conjunctival injection.  Neck: Neck supple, no palpable thyromegaly.  Cardiovascular: RRR, normal S1/S2, no audible murmurs, rubs or gallops.  MSK: No clubbing or cyanosis; normal muscle bulk and tone.  Skin: Normal skin color, temperature, turgor and texture.  Neurological: Alert and oriented times 3.  No tremor.    DATA REVIEW  Each of the following laboratory and/or imaging studies were reviewed.    Component      Latest Ref Rng 5/12/2023  1:59 PM 10/27/2023  12:12 PM   TSH      0.30 - 4.20 uIU/mL 0.36  0.38        ASSESSMENT  1.  Subclinical hyperthyroidism, due to Graves' disease based on pattern of uptake on radioiodine uptake and scan.  Thyroid function tests have normalized on follow-up testing and have been stably normal over the past year.  Clinically euthyroid also.  Given stability, would recommend transfer back to PCP.  We discussed long-term potential for relapse of thyroid disease and symptoms that might suggest excess thyroid hormone which should prompt contact with our clinic.  Would recommend at least annual monitoring of thyroid function tests in primary care clinic, sooner if unusual symptoms.  Can follow-up in endocrinology as needed: I can see patient back in follow-up if concerns arise or thyroid function tests are abnormal.    2.  Graves' disease.  Without evidence of ophthalmopathy or dermopathy.  We reviewed symptoms that might suggest ophthalmopathy that should prompt contact with endocrinology and ophthalmology.    PLAN  -For now, continue careful follow-up in primary care: recommend annual monitoring of thyroid function tests (sooner if  unusual symptoms that might suggest hyperthyroidism, including heat intolerance, tremors, palpitations, insomnia, unintentional weight loss)  -If thyroid function tests are abnormal or questions come up, please contact our clinic   -Otherwise, follow-up in endocrinology as needed      I spent a total of 20 minutes on the date of encounter reviewing medical records, evaluating the patient, coordinating care and documenting in the EHR, as detailed above.      Irving Richard MD   Division of Diabetes, Endocrinology and Metabolism  Department of Medicine      Cc: All Martínez NP          Again, thank you for allowing me to participate in the care of your patient.        Sincerely,        IRVING Richard MD

## 2023-11-03 NOTE — PROGRESS NOTES
ENDOCRINOLOGY FOLLOW-UP         HISTORY OF PRESENT ILLNESS    Marylu Ibanez is seen in follow-up.    Has been doing well since last visit in 2022.  Energy is good.  She has no tremors or palpitations.    No change in the feel of her neck, dysphagia or voice changes.    Remains on OCP: Cycles are occurring generally every month, although she may not have a cycle after taking placebo week at times.     No eye discomfort, redness or vision changes.    Pertinent endocrine and related history:  1.  Subclinical hyperthyroidism.  The patient had annual gynecology visit on 2021: Labs drawn at that visit revealed low TSH, prompting referral to endocrinology.  -At initial endocrinology evaluation, the patient had been feeling well and had not noticed overt thyrotoxic symptoms.  -TSI and TSH receptor antibody checked in 2021 were negative.  -She had radioiodine uptake and scan performed on 2022.  The study showed 9.7% uptake at 3 hours, 20.7% uptake at 24 hours.  Tracer uptake was diffuse throughout the gland without hot or cold nodules.  -. On OCP for contraception. No plans for conception in the future.    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 .    PAST MEDICAL HISTORY  No past medical history on file.    MEDICATIONS  Current Outpatient Medications   Medication Sig Dispense Refill    JUNEL 1/20 1-20 MG-MCG tablet 1 tablet daily      Multiple Vitamin (MULTIVITAMIN ADULT) TABS 1 tablet daily         Allergies, family, and social history were reviewed and documented as needed in EHR.     REVIEW OF SYSTEMS  A focused ROS was performed, with pertinent positives and negatives as noted in the HPI.    PHYSICAL EXAM  /62 (BP Location: Right arm, Patient Position: Sitting, Cuff Size: Adult Regular)   Pulse 80   Wt 63.2 kg (139 lb 4.8 oz)   BMI 23.18 kg/m    Body mass index is 23.18 kg/m .  Constitutional: Vital signs reviewed, as recorded above. Patient is alert,  oriented and appears in no acute distress.  Eyes: PER, EOMI, no stare, lid lag, or retraction; no conjunctival injection.  Neck: Neck supple, no palpable thyromegaly.  Cardiovascular: RRR, normal S1/S2, no audible murmurs, rubs or gallops.  MSK: No clubbing or cyanosis; normal muscle bulk and tone.  Skin: Normal skin color, temperature, turgor and texture.  Neurological: Alert and oriented times 3.  No tremor.    DATA REVIEW  Each of the following laboratory and/or imaging studies were reviewed.    Component      Latest Ref Rng 5/12/2023  1:59 PM 10/27/2023  12:12 PM   TSH      0.30 - 4.20 uIU/mL 0.36  0.38        ASSESSMENT  1.  Subclinical hyperthyroidism, due to Graves' disease based on pattern of uptake on radioiodine uptake and scan.  Thyroid function tests have normalized on follow-up testing and have been stably normal over the past year.  Clinically euthyroid also.  Given stability, would recommend transfer back to PCP.  We discussed long-term potential for relapse of thyroid disease and symptoms that might suggest excess thyroid hormone which should prompt contact with our clinic.  Would recommend at least annual monitoring of thyroid function tests in primary care clinic, sooner if unusual symptoms.  Can follow-up in endocrinology as needed: I can see patient back in follow-up if concerns arise or thyroid function tests are abnormal.    2.  Graves' disease.  Without evidence of ophthalmopathy or dermopathy.  We reviewed symptoms that might suggest ophthalmopathy that should prompt contact with endocrinology and ophthalmology.    PLAN  -For now, continue careful follow-up in primary care: recommend annual monitoring of thyroid function tests (sooner if unusual symptoms that might suggest hyperthyroidism, including heat intolerance, tremors, palpitations, insomnia, unintentional weight loss)  -If thyroid function tests are abnormal or questions come up, please contact our clinic   -Otherwise, follow-up in  endocrinology as needed      I spent a total of 20 minutes on the date of encounter reviewing medical records, evaluating the patient, coordinating care and documenting in the EHR, as detailed above.      Kd Richard MD   Division of Diabetes, Endocrinology and Metabolism  Department of Medicine      Cc: All Martínez NP

## 2023-11-03 NOTE — PATIENT INSTRUCTIONS
-For now, continue careful follow-up in primary care: recommend annual monitoring of thyroid function tests (sooner if unusual symptoms that might suggest hyperthyroidism, including heat intolerance, tremors, palpitations, insomnia, unintentional weight loss)  -If thyroid function tests are abnormal or questions come up, please contact our clinic   -Otherwise, follow-up in endocrinology as needed

## 2024-03-02 ENCOUNTER — HEALTH MAINTENANCE LETTER (OUTPATIENT)
Age: 39
End: 2024-03-02

## 2024-12-18 ENCOUNTER — TRANSFERRED RECORDS (OUTPATIENT)
Dept: HEALTH INFORMATION MANAGEMENT | Facility: CLINIC | Age: 39
End: 2024-12-18

## 2025-03-15 ENCOUNTER — HEALTH MAINTENANCE LETTER (OUTPATIENT)
Age: 40
End: 2025-03-15

## 2025-03-28 ENCOUNTER — TRANSFERRED RECORDS (OUTPATIENT)
Dept: HEALTH INFORMATION MANAGEMENT | Facility: CLINIC | Age: 40
End: 2025-03-28
Payer: COMMERCIAL

## 2025-04-03 ENCOUNTER — TRANSCRIBE ORDERS (OUTPATIENT)
Dept: OTHER | Age: 40
End: 2025-04-03

## 2025-04-03 ENCOUNTER — MEDICAL CORRESPONDENCE (OUTPATIENT)
Dept: HEALTH INFORMATION MANAGEMENT | Facility: CLINIC | Age: 40
End: 2025-04-03
Payer: COMMERCIAL

## 2025-04-03 DIAGNOSIS — Z86.39 HISTORY OF GRAVES' DISEASE: Primary | ICD-10-CM

## 2025-05-28 ENCOUNTER — LAB (OUTPATIENT)
Dept: LAB | Facility: CLINIC | Age: 40
End: 2025-05-28
Payer: COMMERCIAL

## 2025-05-28 ENCOUNTER — OFFICE VISIT (OUTPATIENT)
Dept: ENDOCRINOLOGY | Facility: CLINIC | Age: 40
End: 2025-05-28
Attending: STUDENT IN AN ORGANIZED HEALTH CARE EDUCATION/TRAINING PROGRAM
Payer: COMMERCIAL

## 2025-05-28 VITALS
SYSTOLIC BLOOD PRESSURE: 118 MMHG | DIASTOLIC BLOOD PRESSURE: 72 MMHG | WEIGHT: 142 LBS | HEART RATE: 80 BPM | BODY MASS INDEX: 23.63 KG/M2

## 2025-05-28 DIAGNOSIS — Z86.39 HISTORY OF GRAVES' DISEASE: ICD-10-CM

## 2025-05-28 DIAGNOSIS — R94.6 ABNORMAL FINDING ON THYROID FUNCTION TEST: Primary | ICD-10-CM

## 2025-05-28 DIAGNOSIS — R94.6 ABNORMAL FINDING ON THYROID FUNCTION TEST: ICD-10-CM

## 2025-05-28 LAB
MCV RBC AUTO: 88 FL (ref 78–100)
WBC # BLD AUTO: 7.5 10E3/UL (ref 4–11)

## 2025-05-28 PROCEDURE — 99000 SPECIMEN HANDLING OFFICE-LAB: CPT

## 2025-05-28 PROCEDURE — 83520 IMMUNOASSAY QUANT NOS NONAB: CPT | Mod: 90

## 2025-05-28 PROCEDURE — 84439 ASSAY OF FREE THYROXINE: CPT

## 2025-05-28 PROCEDURE — 36415 COLL VENOUS BLD VENIPUNCTURE: CPT

## 2025-05-28 PROCEDURE — 84445 ASSAY OF TSI GLOBULIN: CPT | Mod: 90

## 2025-05-28 PROCEDURE — 3074F SYST BP LT 130 MM HG: CPT | Performed by: INTERNAL MEDICINE

## 2025-05-28 PROCEDURE — 85048 AUTOMATED LEUKOCYTE COUNT: CPT

## 2025-05-28 PROCEDURE — 84480 ASSAY TRIIODOTHYRONINE (T3): CPT

## 2025-05-28 PROCEDURE — 99214 OFFICE O/P EST MOD 30 MIN: CPT | Performed by: INTERNAL MEDICINE

## 2025-05-28 PROCEDURE — 84443 ASSAY THYROID STIM HORMONE: CPT

## 2025-05-28 PROCEDURE — 3078F DIAST BP <80 MM HG: CPT | Performed by: INTERNAL MEDICINE

## 2025-05-28 PROCEDURE — 80076 HEPATIC FUNCTION PANEL: CPT

## 2025-05-28 RX ORDER — SUMATRIPTAN SUCCINATE 25 MG/1
25 TABLET ORAL
COMMUNITY
Start: 2024-07-10

## 2025-05-28 NOTE — LETTER
5/28/2025      Marylu Ibanez  [address] [facility] [place] [address]      Dear Colleague,    Thank you for referring your patient, Marylu Ibanez, to the [facility]. Please see a copy of my visit note below.      ENDOCRINOLOGY FOLLOW-UP         HISTORY OF PRESENT ILLNESS    Marylu Ibanez is seen in follow-up.    I last saw Ms. Ibanez in 11/2023: At that time, thyroid function tests had been stably normal and the patient had been feeling well.  I therefore recommended transfer of care back to PCP with periodic monitoring of thyroid function.    She subsequently was noted to have abnormal thyroid function tests (low TSH) on labs drawn at a OB annual exam in 12/2024.  Repeat testing on 3/28/2025 revealed persistently low TSH at 0.19 mIU/L.    Ms. Ibanez has been feeling quite well: Energy is good.  She has migraines, which has been a longstanding issue; they tend to cluster but have not changed in nature or frequency remarkably.    She has no tremors, palpitations, heat intolerance, or change in bowel habits.    She has not noted a change in the feel of her neck (no fullness) and she has no dysphagia.    No eye redness, irritation or dryness--may have mild eye discomfort as she nears the end of her monthly supply of contact lenses.    She does not use biotin or iodine supplement.    Continues on OCP, cycling every 2 to 3 months.    Pertinent endocrine and related history:  1.  Subclinical hyperthyroidism.  The patient had annual gynecology visit on 11/1/2021: Labs drawn at that visit revealed low TSH, prompting referral to endocrinology.  -At initial endocrinology evaluation, the patient had been feeling well and had not noticed overt thyrotoxic symptoms.  -TSI and TSH receptor antibody checked in 12/2021 were negative.  -She had radioiodine uptake and scan performed on 2/7/2022.  The study showed 9.7% uptake at 3 hours, 20.7% uptake at 24 hours.  Tracer uptake was diffuse throughout the gland  without hot or cold nodules.  -. On OCP for contraception. No plans for conception in the future.    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 .    PAST MEDICAL HISTORY  No past medical history on file.    MEDICATIONS  Current Outpatient Medications   Medication Sig Dispense Refill     JUNEL 1/20 1-20 MG-MCG tablet 1 tablet daily       Multiple Vitamin (MULTIVITAMIN ADULT) TABS 1 tablet daily         Allergies, family, and social history were reviewed and documented as needed in EHR.     REVIEW OF SYSTEMS  A focused ROS was performed, with pertinent positives and negatives as noted in the HPI.    PHYSICAL EXAM  There were no vitals taken for this visit.  There is no height or weight on file to calculate BMI.  Constitutional: Vital signs reviewed, as recorded above. Patient is alert, oriented and appears in no acute distress.  Eyes: PER, EOMI, no stare, lid lag, or retraction; no conjunctival injection.  Neck: Neck supple, no palpable thyromegaly.  Cardiovascular: RRR, normal S1/S2, no audible murmurs, rubs or gallops.  Respiratory: Clear to auscultation bilaterally.  MSK: No clubbing or cyanosis; normal muscle bulk and tone.  Skin: Normal skin color, temperature, turgor and texture.  Neurological: Alert and oriented times 3.  No tremor.    DATA REVIEW  Each of the following laboratory and/or imaging studies were reviewed.            ASSESSMENT  1.  Abnormal thyroid function tests, with low TSH.  These findings are in the background of history of subclinical hyperthyroidism due to Graves' disease (negative serology but diffuse uptake on radioiodine uptake and scan in ).    Thyroid function tests had normalized on testing but partial suppression of TSH has recurred; waxing and waning nature of thyroid function test abnormalities is consistent with autoimmune thyroid disease.  Would recheck thyroid function tests to determine whether hyperthyroidism remains subclinical or may  be overt.  If subclinical, since the patient is asymptomatic, would recommend careful follow-up without intervention.    We discussed pathophysiology of autoimmune thyroid disease, thresholds at which we would consider treatment (should she develop thyrotoxic symptoms, which we reviewed; if thyroid function tests become consistent with overt hyperthyroidism).    2.  Graves' disease.  Without evidence of ophthalmopathy or dermopathy.      PLAN  -Labs today  -If thyroid function tests are mildly abnormal, we will plan on careful follow-up: in that case, we will anticipate another set of labs in 6 months (update me sooner if noticing unusual symptoms)  -Follow-up in 1 year, with labs before visit--we will arrange for closer follow-up if needed  -Otherwise, follow-up in endocrinology as needed    Orders Placed This Encounter   Procedures     TSH     T3 total     T4 free     Thyroid stimulating immunoglobulin     Thyrotropin Receptor Antibody     Hepatic function panel     WBC count       I spent a total of 34 minutes on the date of encounter reviewing medical records, evaluating the patient, coordinating care and documenting in the EHR, as detailed above.      Kd Richard MD   Division of Diabetes, Endocrinology and Metabolism  Department of Medicine          Again, thank you for allowing me to participate in the care of your patient.        Sincerely,        Kd Richard MD    Electronically signed

## 2025-05-28 NOTE — PATIENT INSTRUCTIONS
-Labs today  -If thyroid function tests are mildly abnormal, we will plan on careful follow-up: in that case, we will anticipate another set of labs in 6 months (update me sooner if noticing unusual symptoms)  -Follow-up in 1 year, with labs before visit--we will arrange for closer follow-up if needed  -Otherwise, follow-up in endocrinology as needed

## 2025-05-28 NOTE — PROGRESS NOTES
ENDOCRINOLOGY FOLLOW-UP         HISTORY OF PRESENT ILLNESS    Marylu Ibanez is seen in follow-up.    I last saw Ms. Ibanez in 2023: At that time, thyroid function tests had been stably normal and the patient had been feeling well.  I therefore recommended transfer of care back to PCP with periodic monitoring of thyroid function.    She subsequently was noted to have abnormal thyroid function tests (low TSH) on labs drawn at a OB annual exam in 2024.  Repeat testing on 3/28/2025 revealed persistently low TSH at 0.19 mIU/L.    Ms. Ibanez has been feeling quite well: Energy is good.  She has migraines, which has been a longstanding issue; they tend to cluster but have not changed in nature or frequency remarkably.    She has no tremors, palpitations, heat intolerance, or change in bowel habits.    She has not noted a change in the feel of her neck (no fullness) and she has no dysphagia.    No eye redness, irritation or dryness--may have mild eye discomfort as she nears the end of her monthly supply of contact lenses.    She does not use biotin or iodine supplement.    Continues on OCP, cycling every 2 to 3 months.    Pertinent endocrine and related history:  1.  Subclinical hyperthyroidism.  The patient had annual gynecology visit on 2021: Labs drawn at that visit revealed low TSH, prompting referral to endocrinology.  -At initial endocrinology evaluation, the patient had been feeling well and had not noticed overt thyrotoxic symptoms.  -TSI and TSH receptor antibody checked in 2021 were negative.  -She had radioiodine uptake and scan performed on 2022.  The study showed 9.7% uptake at 3 hours, 20.7% uptake at 24 hours.  Tracer uptake was diffuse throughout the gland without hot or cold nodules.  -. On OCP for contraception. No plans for conception in the future.    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    MEDICATIONS  Current Outpatient Medications   Medication Sig Dispense Refill    JUNEL 1/20 1-20 MG-MCG tablet 1 tablet daily      Multiple Vitamin (MULTIVITAMIN ADULT) TABS 1 tablet daily         Allergies, family, and social history were reviewed and documented as needed in EHR.     REVIEW OF SYSTEMS  A focused ROS was performed, with pertinent positives and negatives as noted in the HPI.    PHYSICAL EXAM  There were no vitals taken for this visit.  There is no height or weight on file to calculate BMI.  Constitutional: Vital signs reviewed, as recorded above. Patient is alert, oriented and appears in no acute distress.  Eyes: PER, EOMI, no stare, lid lag, or retraction; no conjunctival injection.  Neck: Neck supple, no palpable thyromegaly.  Cardiovascular: RRR, normal S1/S2, no audible murmurs, rubs or gallops.  Respiratory: Clear to auscultation bilaterally.  MSK: No clubbing or cyanosis; normal muscle bulk and tone.  Skin: Normal skin color, temperature, turgor and texture.  Neurological: Alert and oriented times 3.  No tremor.    DATA REVIEW  Each of the following laboratory and/or imaging studies were reviewed.            ASSESSMENT  1.  Abnormal thyroid function tests, with low TSH.  These findings are in the background of history of subclinical hyperthyroidism due to Graves' disease (negative serology but diffuse uptake on radioiodine uptake and scan in 2022).    Thyroid function tests had normalized on testing but partial suppression of TSH has recurred; waxing and waning nature of thyroid function test abnormalities is consistent with autoimmune thyroid disease.  Would recheck thyroid function tests to determine whether hyperthyroidism remains subclinical or may be overt.  If subclinical, since the patient is asymptomatic, would recommend careful follow-up without intervention.    We discussed pathophysiology of autoimmune thyroid disease, thresholds at which we would consider treatment  (should she develop thyrotoxic symptoms, which we reviewed; if thyroid function tests become consistent with overt hyperthyroidism).    2.  Graves' disease.  Without evidence of ophthalmopathy or dermopathy.      PLAN  -Labs today  -If thyroid function tests are mildly abnormal, we will plan on careful follow-up: in that case, we will anticipate another set of labs in 6 months (update me sooner if noticing unusual symptoms)  -Follow-up in 1 year, with labs before visit--we will arrange for closer follow-up if needed  -Otherwise, follow-up in endocrinology as needed    Orders Placed This Encounter   Procedures    TSH    T3 total    T4 free    Thyroid stimulating immunoglobulin    Thyrotropin Receptor Antibody    Hepatic function panel    WBC count       I spent a total of 34 minutes on the date of encounter reviewing medical records, evaluating the patient, coordinating care and documenting in the EHR, as detailed above.      Kd Richard MD   Division of Diabetes, Endocrinology and Metabolism  Department of Medicine

## 2025-05-29 ENCOUNTER — RESULTS FOLLOW-UP (OUTPATIENT)
Dept: ENDOCRINOLOGY | Facility: CLINIC | Age: 40
End: 2025-05-29

## 2025-05-29 LAB
ALBUMIN SERPL BCG-MCNC: 4.4 G/DL (ref 3.5–5.2)
ALP SERPL-CCNC: 71 U/L (ref 40–150)
ALT SERPL W P-5'-P-CCNC: 18 U/L (ref 0–50)
AST SERPL W P-5'-P-CCNC: 20 U/L (ref 0–45)
BILIRUB SERPL-MCNC: 0.2 MG/DL
BILIRUBIN DIRECT (ROCHE PRO & PURE): 0.09 MG/DL (ref 0–0.45)
PROT SERPL-MCNC: 7.6 G/DL (ref 6.4–8.3)
T3 SERPL-MCNC: 148 NG/DL (ref 85–202)
T4 FREE SERPL-MCNC: 1.11 NG/DL (ref 0.9–1.7)
TSH SERPL DL<=0.005 MIU/L-ACNC: 0.36 UIU/ML (ref 0.3–4.2)

## 2025-06-04 LAB — TSI SER-ACNC: <1 TSI INDEX
